# Patient Record
Sex: MALE | Race: WHITE | URBAN - METROPOLITAN AREA
[De-identification: names, ages, dates, MRNs, and addresses within clinical notes are randomized per-mention and may not be internally consistent; named-entity substitution may affect disease eponyms.]

---

## 2020-09-10 NOTE — PROGRESS NOTES
here and take you home after your surgery. You will not be allowed to leave alone or drive yourself home. It is strongly suggested someone stay with you the first 24 hrs. Your surgery will be cancelled if you do not have a ride home. 8. A parent/legal guardian must accompany a child scheduled for surgery and plan to stay at the hospital until the child is discharged. Please do not bring other children with you. 9. Please wear simple, loose fitting clothing to the hospital.  Miguel Dose not bring valuables (money, credit cards, checkbooks, etc.) Do not wear any makeup (including no eye makeup) or nail polish on your fingers or toes. 10. DO NOT wear any jewelry or piercings on day of surgery. All body piercing jewelry must be removed. 11. If you have ___dentures, they will be removed before going to the OR; we will provide you a container. If you wear ___contact lenses or ___glasses, they will be removed; please bring a case for them. 12. Please see your family doctor/pediatrician for a history & physical and/or concerning medications. Bring any test results/reports from your physician's office. PCP__________________Phone___________H&P Appt. Date________             13 If you  have a Living Will and Durable Power of  for Healthcare, please bring in a copy. 15. Notify your Surgeon if you develop any illness between now and surgery  time, cough, cold, fever, sore throat, nausea, vomiting, etc.  Please notify your surgeon if you experience dizziness, shortness of breath or blurred vision between now & the time of your surgery             15. DO NOT shave your operative site 96 hours prior to surgery. For face & neck surgery, men may use an electric razor 48 hours prior to surgery. 16. Shower the night before or morning of surgery using an antibacterial soap or as you have been instructed.              17. To provide excellent care visitors will be visitor can stay or will be asked to wait in the car will depend on the current policy and if social distancing can be maintained. The policy is subject to change at any time. Please make sure the visitor has a cell phone that is on,charged and able to accept calls, as this may be the way that the staff communicates with them. Pain management is NO VISITOR policyThe patients ride is expected to remain in the car with a cell phone for communication. If the ride is leaving the hospital grounds please make sure they are back in time for pickup. Have the patient inform the staff on arrival what their rides plans are while the patient is in the facility. At the MAIN there is one visitor allowed. Please note that the visitor policy is subject to change.

## 2020-09-16 ENCOUNTER — OFFICE VISIT (OUTPATIENT)
Dept: PRIMARY CARE CLINIC | Age: 70
End: 2020-09-16
Payer: MEDICARE

## 2020-09-16 PROCEDURE — 99211 OFF/OP EST MAY X REQ PHY/QHP: CPT | Performed by: NURSE PRACTITIONER

## 2020-09-16 PROCEDURE — G8428 CUR MEDS NOT DOCUMENT: HCPCS | Performed by: NURSE PRACTITIONER

## 2020-09-16 PROCEDURE — G8417 CALC BMI ABV UP PARAM F/U: HCPCS | Performed by: NURSE PRACTITIONER

## 2020-09-16 NOTE — PROGRESS NOTES
Edilberto Horowitz received a viral test for COVID-19. They were educated on isolation and quarantine as appropriate. For any symptoms, they were directed to seek care from their PCP, given contact information to establish with a doctor, directed to an urgent care or the emergency room.

## 2020-09-18 LAB — SARS-COV-2, NAA: NOT DETECTED

## 2020-09-22 ENCOUNTER — ANESTHESIA EVENT (OUTPATIENT)
Dept: ENDOSCOPY | Age: 70
End: 2020-09-22
Payer: MEDICARE

## 2020-09-22 ENCOUNTER — ANESTHESIA (OUTPATIENT)
Dept: ENDOSCOPY | Age: 70
End: 2020-09-22
Payer: MEDICARE

## 2020-09-22 ENCOUNTER — HOSPITAL ENCOUNTER (OUTPATIENT)
Age: 70
Setting detail: OUTPATIENT SURGERY
Discharge: HOME | End: 2020-09-22
Attending: SURGERY | Admitting: SURGERY
Payer: MEDICARE

## 2020-09-22 VITALS
BODY MASS INDEX: 29.16 KG/M2 | HEART RATE: 58 BPM | DIASTOLIC BLOOD PRESSURE: 76 MMHG | WEIGHT: 220 LBS | OXYGEN SATURATION: 96 % | HEIGHT: 73 IN | RESPIRATION RATE: 16 BRPM | SYSTOLIC BLOOD PRESSURE: 125 MMHG | TEMPERATURE: 97.4 F

## 2020-09-22 VITALS — DIASTOLIC BLOOD PRESSURE: 60 MMHG | OXYGEN SATURATION: 99 % | SYSTOLIC BLOOD PRESSURE: 105 MMHG

## 2020-09-22 PROCEDURE — 2580000003 HC RX 258: Performed by: NURSE ANESTHETIST, CERTIFIED REGISTERED

## 2020-09-22 PROCEDURE — 88305 TISSUE EXAM BY PATHOLOGIST: CPT

## 2020-09-22 PROCEDURE — 7100000001 HC PACU RECOVERY - ADDTL 15 MIN: Performed by: SURGERY

## 2020-09-22 PROCEDURE — 3609010600 HC COLONOSCOPY POLYPECTOMY SNARE/COLD BIOPSY: Performed by: SURGERY

## 2020-09-22 PROCEDURE — 6360000002 HC RX W HCPCS: Performed by: NURSE ANESTHETIST, CERTIFIED REGISTERED

## 2020-09-22 PROCEDURE — 3700000001 HC ADD 15 MINUTES (ANESTHESIA): Performed by: SURGERY

## 2020-09-22 PROCEDURE — 7100000000 HC PACU RECOVERY - FIRST 15 MIN: Performed by: SURGERY

## 2020-09-22 PROCEDURE — 3700000000 HC ANESTHESIA ATTENDED CARE: Performed by: SURGERY

## 2020-09-22 PROCEDURE — 2500000003 HC RX 250 WO HCPCS: Performed by: NURSE ANESTHETIST, CERTIFIED REGISTERED

## 2020-09-22 PROCEDURE — 7100000011 HC PHASE II RECOVERY - ADDTL 15 MIN: Performed by: SURGERY

## 2020-09-22 PROCEDURE — 2709999900 HC NON-CHARGEABLE SUPPLY: Performed by: SURGERY

## 2020-09-22 PROCEDURE — 7100000010 HC PHASE II RECOVERY - FIRST 15 MIN: Performed by: SURGERY

## 2020-09-22 PROCEDURE — 3609010300 HC COLONOSCOPY W/BIOPSY SINGLE/MULTIPLE: Performed by: SURGERY

## 2020-09-22 RX ORDER — PROPOFOL 10 MG/ML
INJECTION, EMULSION INTRAVENOUS CONTINUOUS PRN
Status: DISCONTINUED | OUTPATIENT
Start: 2020-09-22 | End: 2020-09-22 | Stop reason: SDUPTHER

## 2020-09-22 RX ORDER — LIDOCAINE HYDROCHLORIDE 20 MG/ML
INJECTION, SOLUTION INFILTRATION; PERINEURAL PRN
Status: DISCONTINUED | OUTPATIENT
Start: 2020-09-22 | End: 2020-09-22 | Stop reason: SDUPTHER

## 2020-09-22 RX ORDER — PROPOFOL 10 MG/ML
INJECTION, EMULSION INTRAVENOUS PRN
Status: DISCONTINUED | OUTPATIENT
Start: 2020-09-22 | End: 2020-09-22 | Stop reason: SDUPTHER

## 2020-09-22 RX ORDER — SODIUM CHLORIDE 9 MG/ML
INJECTION, SOLUTION INTRAVENOUS CONTINUOUS PRN
Status: DISCONTINUED | OUTPATIENT
Start: 2020-09-22 | End: 2020-09-22 | Stop reason: SDUPTHER

## 2020-09-22 RX ADMIN — PROPOFOL 50 MG: 10 INJECTION, EMULSION INTRAVENOUS at 13:00

## 2020-09-22 RX ADMIN — PROPOFOL 50 MG: 10 INJECTION, EMULSION INTRAVENOUS at 13:05

## 2020-09-22 RX ADMIN — PROPOFOL 100 MG: 10 INJECTION, EMULSION INTRAVENOUS at 12:55

## 2020-09-22 RX ADMIN — SODIUM CHLORIDE: 9 INJECTION, SOLUTION INTRAVENOUS at 12:47

## 2020-09-22 RX ADMIN — LIDOCAINE HYDROCHLORIDE 100 MG: 20 INJECTION, SOLUTION INFILTRATION; PERINEURAL at 12:55

## 2020-09-22 RX ADMIN — PROPOFOL 140 MCG/KG/MIN: 10 INJECTION, EMULSION INTRAVENOUS at 12:56

## 2020-09-22 ASSESSMENT — LIFESTYLE VARIABLES: SMOKING_STATUS: 0

## 2020-09-22 NOTE — PROGRESS NOTES
Patient arrived from ENDO to PACU spot 10. Attached to monitoring system. Report received per CRNA and ENDO nurse. No problems reported intra procedurally. Patient awake upon arriving to PACU. Status stable.

## 2020-09-22 NOTE — PROGRESS NOTES
Patient's wife in at bedside, discharge instructions given and signed by wife Walter Mosley. Al questions and concerns addressed.

## 2020-09-22 NOTE — ANESTHESIA POSTPROCEDURE EVALUATION
Department of Anesthesiology  Postprocedure Note    Patient: Artis Rosas  MRN: [id number]  YOB: 1950  Date of evaluation: 9/22/2020  Time:  2:11 PM     Procedure Summary     Date:  09/22/20 Room / Location:  [address] [place] [address]    Anesthesia Start:  1252 Anesthesia Stop:  5687    Procedure:  COLONOSCOPY POLYPECTOMY SNARE/COLD BIOPSY (N/A ) Diagnosis:  (SCREENING Z12.11)    Surgeon:  Tala Levy MD Responsible Provider:  Rach Tidwell MD    Anesthesia Type:  MAC ASA Status:  2          Anesthesia Type: MAC    Miladis Phase I: Miladis Score: 10    Miladis Phase II: Miladis Score: 10    Last vitals: Reviewed and per EMR flowsheets.        Anesthesia Post Evaluation    Patient location during evaluation: PACU  Patient participation: complete - patient participated  Level of consciousness: awake and awake and alert  Pain score: 2  Airway patency: patent  Nausea & Vomiting: no vomiting  Complications: no  Cardiovascular status: blood pressure returned to baseline  Respiratory status: acceptable  Hydration status: euvolemic

## 2020-09-22 NOTE — ANESTHESIA PRE PROCEDURE
Department of Anesthesiology  Preprocedure Note       Name:  Cheyenne Moreno   Age:  72 y.o.  :  1950                                          MRN:  [id number]         Date:  2020      Surgeon: Laila Saini):  Allison Messer MD    Procedure: Procedure(s):  COLONOSCOPY DIAGNOSTIC    Medications prior to admission:   Prior to Admission medications    Not on File       Current medications:    No current facility-administered medications for this encounter. Allergies:  Not on File    Problem List:  There is no problem list on file for this patient. Past Medical History:  History reviewed. No pertinent past medical history. Past Surgical History:        Procedure Laterality Date    COLONOSCOPY      KNEE ARTHROSCOPY Right     TOTAL KNEE ARTHROPLASTY         Social History:    Social History     Tobacco Use    Smoking status: Former Smoker     Packs/day: 0.50     Years: 20.00     Pack years: 10.00     Last attempt to quit: 2005     Years since quittin.8    Smokeless tobacco: Current User    Tobacco comment: snuff- less than a can a day   Substance Use Topics    Alcohol use: Yes     Comment: 1-2 glass of wine daily                                Ready to quit: Not Answered  Counseling given: Not Answered  Comment: snuff- less than a can a day      Vital Signs (Current):   Vitals:    09/10/20 1450   Weight: 220 lb (99.8 kg)   Height: 6' 1\" (1.854 m)                                              BP Readings from Last 3 Encounters:   11/17/15 100/62       NPO Status:                                                                                 BMI:   Wt Readings from Last 3 Encounters:   09/10/20 220 lb (99.8 kg)   11/17/15 210 lb 8.6 oz (95.5 kg)   11/13/15 210 lb (95.3 kg)     Body mass index is 29.03 kg/m².     CBC: No results found for: WBC, RBC, HGB, HCT, MCV, RDW, PLT    CMP: No results found for: NA, K, CL, CO2, BUN, CREATININE, GFRAA, AGRATIO, LABGLOM, GLUCOSE, PROT, CALCIUM, BILITOT, ALKPHOS, AST, ALT    POC Tests: No results for input(s): POCGLU, POCNA, POCK, POCCL, POCBUN, POCHEMO, POCHCT in the last 72 hours. Coags: No results found for: PROTIME, INR, APTT    HCG (If Applicable): No results found for: PREGTESTUR, PREGSERUM, HCG, HCGQUANT     ABGs: No results found for: PHART, PO2ART, UFT2FHB, UEY8ZSV, BEART, X1IYSUHU     Type & Screen (If Applicable):  No results found for: LABABO, LABRH    Drug/Infectious Status (If Applicable):  No results found for: HIV, HEPCAB    COVID-19 Screening (If Applicable):   Lab Results   Component Value Date    COVID19 NOT DETECTED 09/16/2020         Anesthesia Evaluation  Patient summary reviewed and Nursing notes reviewed no history of anesthetic complications:   Airway: Mallampati: II  TM distance: >3 FB   Neck ROM: full  Mouth opening: > = 3 FB Dental: normal exam         Pulmonary:normal exam  breath sounds clear to auscultation      (-) COPD, asthma, sleep apnea and not a current smoker (former)                           Cardiovascular:Negative CV ROS        (-) hypertension, past MI, CAD, CABG/stent, dysrhythmias,  angina and  CHF      Rhythm: regular  Rate: normal                    Neuro/Psych:   Negative Neuro/Psych ROS     (-) seizures, TIA and CVA           GI/Hepatic/Renal:   (+) GERD: well controlled,      (-) liver disease and no renal disease       Endo/Other: Negative Endo/Other ROS       (-) diabetes mellitus, hypothyroidism, hyperthyroidism               Abdominal:   (+) obese,         Vascular:                                        Anesthesia Plan      MAC     ASA 2       Induction: intravenous. Anesthetic plan and risks discussed with patient. Plan discussed with CRNA.                   Ravin Brewer MD   9/22/2020

## 2020-09-22 NOTE — PROGRESS NOTES
Dr. Josiah Kyle in at bedside and spoke to annie. He states patient has 3 polyps which he removed, follow up in 2 weeks and repeat colonoscopy in 5 years. This information will be forwarded to wife during discharge instructions.

## 2020-09-22 NOTE — BRIEF OP NOTE
Brief Postoperative Note      Patient: Anahy Huston  YOB: 1950  MRN: [id number]    Date of Procedure: 9/22/2020    Pre-Op Diagnosis: SCREENING Z12.11H/O colon polyps    Post-Op Diagnosis: Same plus colon polyps and diverticulosios       Procedure(s):  COLONOSCOPY DIAGNOSTIC wit 2 biopsy polypectomies of sigmoid colon and snare polypectomy of the rectom    Surgeon(s):  Melissa Frausto MD    Assistant:  * No surgical staff found *    Anesthesia: Monitor Anesthesia Care    Estimated Blood Loss (mL): Minimal    Complications: None    Specimens:   ID Type Source Tests Collected by Time Destination   A : Polyps X3 Sigmoid Tissue Tissue SURGICAL PATHOLOGY Melissa Frausto MD 9/22/2020 1316        Implants:  * No implants in log *      Drains: * No LDAs found *    Findings: as above    Electronically signed by Melissa Frausto MD on 9/22/2020 at 1:19 PM

## 2020-09-22 NOTE — H&P
Subjective:     Colten Apodaca is a 72 y.o. male who was referred for evaluation of previous adenomatous polyps. Patient's medications, allergies, past medical, surgical, social and family histories were reviewed and updated as appropriate. Past medical history:  has no past medical history on file. Past surgical history:  has a past surgical history that includes Knee arthroscopy (Right); Colonoscopy; and Total knee arthroplasty (2015). Past social history:  reports that he quit smoking about 14 years ago. He has a 10.00 pack-year smoking history. He uses smokeless tobacco. He reports current alcohol use. He reports that he does not use drugs. Past family history: family history includes Heart Disease in his father and mother; High Blood Pressure in his father. Allergies: Not on File  Current medications: No current facility-administered medications for this encounter. Review of Systems  A comprehensive review of systems was negative. Objective:     Ht 6' 1\" (1.854 m)   Wt 220 lb (99.8 kg)   BMI 29.03 kg/m²   General appearance: alert, appears stated age and cooperative  Eyes: conjunctivae/corneas clear. PERRL, EOM's intact. Fundi benign. Ears: normal TM's and external ear canals both ears  Heart: regular rate and rhythm, S1, S2 normal, no murmur, click, rub or gallop  Abdomen: soft, non-tender; bowel sounds normal; no masses,  no organomegaly  Lungs: clear to auscultation bilaterally  Rectal: normal tone    Plan:     1. Colonoscopy.

## 2021-02-19 ENCOUNTER — IMMUNIZATION (OUTPATIENT)
Dept: PRIMARY CARE CLINIC | Age: 71
End: 2021-02-19
Payer: MEDICARE

## 2021-02-19 PROCEDURE — 0011A COVID-19, MODERNA VACCINE 100MCG/0.5ML DOSE: CPT | Performed by: FAMILY MEDICINE

## 2021-02-19 PROCEDURE — 91301 COVID-19, MODERNA VACCINE 100MCG/0.5ML DOSE: CPT | Performed by: FAMILY MEDICINE

## 2021-03-19 ENCOUNTER — IMMUNIZATION (OUTPATIENT)
Dept: PRIMARY CARE CLINIC | Age: 71
End: 2021-03-19
Payer: MEDICARE

## 2021-03-19 PROCEDURE — 91301 COVID-19, MODERNA VACCINE 100MCG/0.5ML DOSE: CPT | Performed by: FAMILY MEDICINE

## 2021-03-19 PROCEDURE — 0012A COVID-19, MODERNA VACCINE 100MCG/0.5ML DOSE: CPT | Performed by: FAMILY MEDICINE

## 2025-07-18 NOTE — PROGRESS NOTES
PATIENT REACHED   YES____NO__X__    PREOP INSTRUCTIONS LEFT ON VM MRUDAR___[phone]____________      DATE__7/24/25_______ TIME_1215________ARRIVAL__1115______PLACE__[address]__________  NOTHING TO EAT OR DRINK  AFTER MIDNIGHT THE EVENING PRIOR OR AS INSTRUCTED BY YOUR DR.  YOU NEED A RESPONSIBLE ADULT AGE 18 OR OLDER TO DRIVE YOU HOME  PLEASE BRING INSURANCE CARD.PICTURE ID AND COMPLETE LIST OF MEDS  WEAR LOOSE COMFORTABLE CLOTHING  FOLLOW ANY INSTRUCTIONS YOUR DRS OFFICE HAS GIVEN YOU,INCLUDING WHAT MEDICATIONS TO TAKE THE AM OF PROCEDURE AND WHEN AND IF YOU NEED TO STOP ANY BLOOD THINNERS. IF YOU HAVE QUESTIONS REGARDING THIS CALL THE OFFICE  THE GOAL BLOOD SUGAR THE AM OF PROCEDURE  OR LESS ABOVE THAT THE PROCEDURE MAY BE CANCELLED  ANY QUESTIONS CALL YOUR DOCTOR.ALSO,PLEASE READ THE INSTRUCTION PACKET FROM YOUR DR IF YOU RECEIVED ONE.  SPINE INTERVENTION NUMBER [phone]      OTHER___________________________________      VISITOR POLICY(subject to change)    Current policy is 2 visitors per patient. No children. Masks are required.

## 2025-07-24 ENCOUNTER — APPOINTMENT (OUTPATIENT)
Dept: GENERAL RADIOLOGY | Age: 75
End: 2025-07-24
Attending: PHYSICAL MEDICINE & REHABILITATION
Payer: MEDICARE

## 2025-07-24 ENCOUNTER — HOSPITAL ENCOUNTER (OUTPATIENT)
Age: 75
Setting detail: OUTPATIENT SURGERY
Discharge: HOME | End: 2025-07-24
Attending: PHYSICAL MEDICINE & REHABILITATION | Admitting: PHYSICAL MEDICINE & REHABILITATION
Payer: MEDICARE

## 2025-07-24 VITALS
RESPIRATION RATE: 16 BRPM | BODY MASS INDEX: 28.49 KG/M2 | SYSTOLIC BLOOD PRESSURE: 137 MMHG | TEMPERATURE: 97.7 F | OXYGEN SATURATION: 100 % | HEIGHT: 73 IN | WEIGHT: 215 LBS | HEART RATE: 58 BPM | DIASTOLIC BLOOD PRESSURE: 69 MMHG

## 2025-07-24 PROCEDURE — 77003 FLUOROGUIDE FOR SPINE INJECT: CPT

## 2025-07-24 PROCEDURE — 2709999900 HC NON-CHARGEABLE SUPPLY: Performed by: PHYSICAL MEDICINE & REHABILITATION

## 2025-07-24 PROCEDURE — 6360000004 HC RX CONTRAST MEDICATION: Performed by: PHYSICAL MEDICINE & REHABILITATION

## 2025-07-24 PROCEDURE — 99152 MOD SED SAME PHYS/QHP 5/>YRS: CPT | Performed by: PHYSICAL MEDICINE & REHABILITATION

## 2025-07-24 PROCEDURE — 3610000054 HC PAIN LEVEL 3 BASE (NON-OR): Performed by: PHYSICAL MEDICINE & REHABILITATION

## 2025-07-24 PROCEDURE — 6360000002 HC RX W HCPCS: Performed by: PHYSICAL MEDICINE & REHABILITATION

## 2025-07-24 RX ORDER — MIDAZOLAM HYDROCHLORIDE 1 MG/ML
INJECTION, SOLUTION INTRAMUSCULAR; INTRAVENOUS
Status: DISCONTINUED | OUTPATIENT
Start: 2025-07-24 | End: 2025-07-24 | Stop reason: ALTCHOICE

## 2025-07-24 RX ORDER — DEXAMETHASONE SODIUM PHOSPHATE 10 MG/ML
INJECTION, SOLUTION INTRAMUSCULAR; INTRAVENOUS
Status: DISCONTINUED | OUTPATIENT
Start: 2025-07-24 | End: 2025-07-24 | Stop reason: ALTCHOICE

## 2025-07-24 RX ORDER — CLOPIDOGREL BISULFATE 75 MG/1
75 TABLET ORAL DAILY
COMMUNITY
Start: 2025-04-10

## 2025-07-24 RX ORDER — LIDOCAINE HYDROCHLORIDE 10 MG/ML
INJECTION, SOLUTION EPIDURAL; INFILTRATION; INTRACAUDAL; PERINEURAL
Status: DISCONTINUED | OUTPATIENT
Start: 2025-07-24 | End: 2025-07-24 | Stop reason: ALTCHOICE

## 2025-07-24 RX ORDER — HYDROCODONE BITARTRATE AND ACETAMINOPHEN 5; 325 MG/1; MG/1
TABLET ORAL EVERY 8 HOURS PRN
COMMUNITY

## 2025-07-24 RX ORDER — FINASTERIDE 5 MG/1
TABLET, FILM COATED ORAL DAILY
COMMUNITY

## 2025-07-24 RX ORDER — IOPAMIDOL 612 MG/ML
INJECTION, SOLUTION INTRATHECAL
Status: DISCONTINUED | OUTPATIENT
Start: 2025-07-24 | End: 2025-07-24 | Stop reason: ALTCHOICE

## 2025-07-24 RX ORDER — ASPIRIN 81 MG/1
81 TABLET ORAL DAILY
COMMUNITY
Start: 2025-04-09 | End: 2026-04-09

## 2025-07-24 RX ORDER — FENTANYL CITRATE 50 UG/ML
INJECTION, SOLUTION INTRAMUSCULAR; INTRAVENOUS
Status: DISCONTINUED | OUTPATIENT
Start: 2025-07-24 | End: 2025-07-24 | Stop reason: ALTCHOICE

## 2025-07-24 RX ORDER — ATORVASTATIN CALCIUM 40 MG/1
40 TABLET, FILM COATED ORAL DAILY
COMMUNITY
Start: 2025-02-13 | End: 2026-02-13

## 2025-07-24 RX ORDER — TRAMADOL HYDROCHLORIDE 50 MG/1
TABLET ORAL EVERY 6 HOURS PRN
COMMUNITY

## 2025-07-24 ASSESSMENT — PAIN - FUNCTIONAL ASSESSMENT
PAIN_FUNCTIONAL_ASSESSMENT: 0-10
PAIN_FUNCTIONAL_ASSESSMENT: NONE - DENIES PAIN

## 2025-07-24 NOTE — OP NOTE
PATIENT:  Brooks Saini  AGE:  74 yrs  MEDICAL RECORD #:  [id number]  YOB: 1950     DATE:  7/24/2025  PHYSICIAN: Akash Clemens M.D.     PROCEDURE: L5-S1 interlaminar epidural steroid injection under fluoroscopy.     PRE-OP DIAGNOSIS:  Lumbar Pain/Radiculopathy     POST-OP DIAGNOSIS:  same     HISTORY OF PRESENT ILLNESS:  See office notes. Patient has failed previous less-invasive treatments.     ALLERGIES:  Gabapentin, Tramadol, and Amiodarone     MEDICATIONS:    Current Facility-Administered Medications   Medication Dose Route Frequency Provider Last Rate Last Admin    fentaNYL (SUBLIMAZE) injection    Once PRN Akash Clemens MD   25 mcg at 07/24/25 1203    midazolam (VERSED)    Once PRN Akash Clemens MD   0.5 mg at 07/24/25 1203    lidocaine PF 1 % injection    Once PRN Akash Clemens MD   5 mL at 07/24/25 1205    iopamidol (ISOVUE-M 300) 61 % injection    Once PRN Akash Clemens MD   1 mL at 07/24/25 1206    dexAMETHasone (PF) (DECADRON)    Once PRN Akash Clemens MD   20 mg at 07/24/25 1206        PHYSICAL EXAMINATION:              General:  Awake, alert              Heart:  No audible murmurs, extremities well perfused              Lungs:  No increased WOB or audible wheezing              Extremities:  Normal tone. Warm. No swelling.      Anesthesia: 0.5 mg Versed and 25 mcg fentanyl    Estimated blood loss: None  Complications: None  Specimen: None    DESCRIPTION OF PROCEDURE:     Components of the procedure were again reviewed with the patient prior to the procedure.  He is aware of risks including infection, bleeding, allergic reaction, and nerve injury.  He had ample opportunity for additional questions.  He elected to proceed with treatment.     The patient was placed in the prone position.  Cardiovascular monitoring was initiated, and vital signs were stable prior to, during, and after the procedure.  Utilizing fluoroscopy, the L5-S1 vertebrae were identified.  The area

## 2025-07-24 NOTE — H&P
HISTORY AND PHYSICAL/PRE-SEDATION ASSESSMENT    Patient:  Brooks Saini   :  1950  Medical Record No.:  [id number]   Date:  2025  Physician:  Akash Clemens MD  Facility: [facility]    HISTORY OF PRESENT ILLNESS:                 The patient is a 74 y.o. male whom presents with leg pain. Review of the imaging and physical exam of the patient confirmed the pre-procedure diagnosis.  After a thorough discussion of risks, benefits and alternatives informed consent was obtained.    Diagnosis:  Pre-Op Diagnosis Codes:      * Lumbar radiculopathy [M54.16]    Past Medical History:   Past Medical History:   Diagnosis Date    Atrial fibrillation, unspecified type (HCC)     CAD (coronary artery disease)     Colon polyps     Hemorrhoids     SHAINA (obstructive sleep apnea)     no CPAP        Past Surgical History:     Past Surgical History:   Procedure Laterality Date    ABLATION OF DYSRHYTHMIC FOCUS      COLONOSCOPY      COLONOSCOPY N/A 2020    COLONOSCOPY POLYPECTOMY SNARE/COLD BIOPSY performed by Alex Bush MD at [facility] ENDOSCOPY    COLONOSCOPY  2020    COLONOSCOPY WITH BIOPSY performed by Alex Bush MD at [facility] ENDOSCOPY    CORONARY ANGIOPLASTY WITH STENT PLACEMENT      HEMORRHOID SURGERY      JOINT REPLACEMENT Right     hip    JOINT REPLACEMENT Right 2015    knee    LEFT ATRIAL APPENDAGE OCCLUDER DEVICE  2025    watchman device    TOTAL KNEE ARTHROPLASTY Right        Current Medications:   Prior to Admission medications    Medication Sig Start Date End Date Taking? Authorizing Provider   aspirin 81 MG EC tablet Take 1 tablet by mouth daily 25 Yes Provider, MD Rin   atorvastatin (LIPITOR) 40 MG tablet Take 1 tablet by mouth daily 25 Yes Provider, MD Rin   clopidogrel (PLAVIX) 75 MG tablet Take 1 tablet by mouth daily 4/10/25  Yes Provider, MD Rin   HYDROcodone-acetaminophen (NORCO) 5-325 MG per

## 2025-07-24 NOTE — PROGRESS NOTES
____L5-S1 INTERLAMINAR EPIDURAL STEROID INJECTION WITH FLUOROSCOPY - [place] ___________ site (marking) observed and reported at handoff

## (undated) DEVICE — FORCEPS BX L240CM WRK CHN 2.8MM STD CAP W/ NDL MIC MESH

## (undated) DEVICE — STANDARD HYPODERMIC NEEDLE,POLYPROPYLENE HUB: Brand: MONOJECT

## (undated) DEVICE — APPLICATOR MEDICATED 26 CC SOLUTION HI LT ORNG CHLORAPREP

## (undated) DEVICE — AVANOS* EXTENSION SETS: Brand: EXTENSION SET, MINI BORE, 12" LENGTH, 0.50ML VOLUME 25

## (undated) DEVICE — TRAP SPEC RETRV CLR PLAS POLYP IN LN SUCT QUIK CTCH

## (undated) DEVICE — NEEDLE SPNL 22GA L3.5IN BLK HUB S STL REG WALL FIT STYL

## (undated) DEVICE — SET VLV 3 PC AWS DISPOSABLE GRDIAN SCOPEVALET

## (undated) DEVICE — SOLUTION IV IRRIG WATER 500ML POUR BRL ST 2F7113

## (undated) DEVICE — SNARE ENDOSCP L240CM SHTH DIA24MM LOOP W10MM POLYP RND REINF

## (undated) DEVICE — MEDIA CONTRAST RX ISOVUE-300 61% 30ML VIALS

## (undated) DEVICE — PROCEDURE KIT ENDOSCP CUST

## (undated) DEVICE — BW-412T DISP COMBO CLEANING BRUSH: Brand: SINGLE USE COMBINATION CLEANING BRUSH

## (undated) DEVICE — SYRINGE MED 3ML CLR PLAS LUERLOCK CONN VI ACCS INTLNK 15GA

## (undated) DEVICE — GLOVE ORANGE PI 8   MSG9080

## (undated) DEVICE — TRAY ANES SUPP NO DRUG CUST